# Patient Record
Sex: FEMALE | Race: WHITE | NOT HISPANIC OR LATINO | Employment: OTHER | URBAN - NONMETROPOLITAN AREA
[De-identification: names, ages, dates, MRNs, and addresses within clinical notes are randomized per-mention and may not be internally consistent; named-entity substitution may affect disease eponyms.]

---

## 2017-01-18 ENCOUNTER — HOSPITAL ENCOUNTER (OUTPATIENT)
Dept: GENERAL RADIOLOGY | Facility: HOSPITAL | Age: 72
Discharge: HOME | End: 2017-01-18
Attending: FAMILY MEDICINE | Admitting: FAMILY MEDICINE
Payer: COMMERCIAL

## 2017-01-18 PROCEDURE — 77080 DXA BONE DENSITY AXIAL: CPT | Mod: TC

## 2017-03-03 ENCOUNTER — OFFICE VISIT (OUTPATIENT)
Dept: CHIROPRACTIC MEDICINE | Facility: OTHER | Age: 72
End: 2017-03-03
Attending: CHIROPRACTOR
Payer: COMMERCIAL

## 2017-03-03 DIAGNOSIS — M54.50 ACUTE BILATERAL LOW BACK PAIN WITHOUT SCIATICA: ICD-10-CM

## 2017-03-03 DIAGNOSIS — M99.02 SEGMENTAL AND SOMATIC DYSFUNCTION OF THORACIC REGION: ICD-10-CM

## 2017-03-03 DIAGNOSIS — M99.03 SEGMENTAL AND SOMATIC DYSFUNCTION OF LUMBAR REGION: Primary | ICD-10-CM

## 2017-03-03 PROCEDURE — 98940 CHIROPRACT MANJ 1-2 REGIONS: CPT | Mod: AT | Performed by: CHIROPRACTOR

## 2017-03-03 NOTE — MR AVS SNAPSHOT
After Visit Summary   3/3/2017    Luz Maria Tan    MRN: [id number]           Patient Information     Date Of Birth          1945        Visit Information        Provider Department      3/3/2017 10:10 AM Osmin Sebastian DC  [facility]        Today's Diagnoses     Segmental and somatic dysfunction of lumbar region    -  1    Acute bilateral low back pain without sciatica        Segmental and somatic dysfunction of thoracic region           Follow-ups after your visit        Who to contact     If you have questions or need follow up information about today's clinic visit or your schedule please contact  [facility] ASHVIN ELIZALDE directly at [phone].  Normal or non-critical lab and imaging results will be communicated to you by iQVCloudhart, letter or phone within 4 business days after the clinic has received the results. If you do not hear from us within 7 days, please contact the clinic through iQVCloudhart or phone. If you have a critical or abnormal lab result, we will notify you by phone as soon as possible.  Submit refill requests through [employer] or call your pharmacy and they will forward the refill request to us. Please allow 3 business days for your refill to be completed.          Additional Information About Your Visit        MyChart Information     [employer] gives you secure access to your electronic health record. If you see a primary care provider, you can also send messages to your care team and make appointments. If you have questions, please call your primary care clinic.  If you do not have a primary care provider, please call [phone] and they will assist you.        Care EveryWhere ID     This is your Care EveryWhere ID. This could be used by other organizations to access your [facility] medical records  [id number]         Blood Pressure from Last 3 Encounters:   04/28/15 132/72   04/13/15 128/77   11/14/14 119/79    Weight from Last 3 Encounters:   04/27/15 181 lb (82.1  kg)              We Performed the Following     CHIROPRAC MANIP,SPINAL,1-2 REGIONS        Primary Care Provider Office Phone # Fax #    Familia Morton [phone]       [facility] [address] [address]        Thank you!     Thank you for choosing  [facility]  for your care. Our goal is always to provide you with excellent care. Hearing back from our patients is one way we can continue to improve our services. Please take a few minutes to complete the written survey that you may receive in the mail after your visit with us. Thank you!             Your Updated Medication List - Protect others around you: Learn how to safely use, store and throw away your medicines at www.disposemymeds.org.          This list is accurate as of: 3/3/17 11:59 PM.  Always use your most recent med list.                   Brand Name Dispense Instructions for use    ASPIRIN PO      Take 81 mg by mouth daily       CALCIUM 600 + MINERALS 600-200 MG-UNIT Tabs      Take 1 tablet by mouth daily       EVISTA 60 MG tablet   Generic drug:  raloxifene      Take 60 mg by mouth daily       Flaxseed Oil Oil      2 capsules daily       Garlic Oil 1000 MG Caps      Take 1 capsule by mouth daily       glucosamine-chondroitinoitin 750-600 MG Tabs      Take 1 tablet by mouth 2 times daily       LISINOPRIL PO      Take 20 mg by mouth daily       MULTIVITAMINS PO      Take 1 tablet by mouth daily       OMEGA-3 FISH OIL PO      Take 1 g by mouth daily

## 2017-03-07 NOTE — PROGRESS NOTES
Subjective Finding:    Chief compalint: Patient presents with:  Back Pain  , Pain Scale: 4/10, Intensity: dull, Duration: 1 weeks, Radiating: no.    Date of injury:     Activities that the pain restricts:   Home/household/hobbies/social activities: no.  Work duties: no.  Sleep: no.  Makes symptoms better: rest.  Makes symptoms worse: .  Have you seen anyone else for the symptoms? no.  Work related: no.  Automobile related injury: no.    Objective and Assessment:    Posture Analysis:   High shoulder: right.  Head tilt: right.  High iliac crest: .  Head carriage: forward.  Thoracic Kyphosis: neutral.  Lumbar Lordosis: neutral.    Lumbar Range of Motion: extension decreased.  Cervical Range of Motion: .  Thoracic Range of Motion: extension decreased.  Extremity Range of Motion: .    Palpation:    LUmbar paraspinal tenderness    Segmental dysfunction pre-treatment and treatment area: L3-4   T6.    Assessment post-treatment:  Cervical: ROM increased.  Thoracic: ROM increased.  Lumbar: .    Comments: .      Complicating Factors: .    Plan / Procedure:    Treatment plan: PRN.  Instructed patient: stretch as instructed at visit.  Short term goals: reduce pain.  Long term goals: restore normal function.  Prognosis: excellent.

## 2017-08-15 ENCOUNTER — DOCUMENTATION ONLY (OUTPATIENT)
Dept: OTHER | Facility: CLINIC | Age: 72
End: 2017-08-15

## 2017-08-15 PROBLEM — Z71.89 ACP (ADVANCE CARE PLANNING): Status: ACTIVE | Noted: 2017-08-15

## 2019-01-23 ENCOUNTER — TRANSFERRED RECORDS (OUTPATIENT)
Dept: HEALTH INFORMATION MANAGEMENT | Facility: HOSPITAL | Age: 74
End: 2019-01-23

## 2019-06-05 ENCOUNTER — ANESTHESIA EVENT (OUTPATIENT)
Dept: SURGERY | Facility: HOSPITAL | Age: 74
End: 2019-06-05
Payer: COMMERCIAL

## 2019-06-05 PROBLEM — H40.9 GLAUCOMA: Status: ACTIVE | Noted: 2019-06-05

## 2019-06-05 PROBLEM — I10 HYPERTENSION: Status: ACTIVE | Noted: 2019-06-05

## 2019-06-05 PROBLEM — E78.5 HYPERLIPIDEMIA: Status: ACTIVE | Noted: 2019-06-05

## 2019-06-05 ASSESSMENT — LIFESTYLE VARIABLES: TOBACCO_USE: 1

## 2019-06-05 NOTE — ANESTHESIA PREPROCEDURE EVALUATION
Anesthesia Evaluation     . Pt has had prior anesthetic.     No history of anesthetic complications          ROS/MED HX    ENT/Pulmonary: Comment: glaucoma    (+)tobacco use, Past use , . .    Neurologic:       Cardiovascular: Comment: Abdominal aortic atherosclerosis (H    (+) Dyslipidemia, hypertension-range: not on beta blocker, Peripheral Vascular Disease (Abdominal Aortic Atherosclerosis)---. : . . . :. .       METS/Exercise Tolerance:     Hematologic:         Musculoskeletal:         GI/Hepatic:     (+) bowel prep,       Renal/Genitourinary: Comment: Polycystic breast disease        Endo:     (+) Obesity, .      Psychiatric:         Infectious Disease:         Malignancy:         Other:    (+) No chance of pregnancy   - neg other ROS                 Physical Exam  Normal systems: cardiovascular, pulmonary and dental    Airway   Mallampati: III  TM distance: <3 FB  Neck ROM: full    Dental     Cardiovascular   Rhythm and rate: regular and normal      Pulmonary                     Anesthesia Plan      History & Physical Review  History and physical reviewed and following examination; no interval change.    ASA Status:  2 .        Plan for MAC Reason for MAC:  Deep or markedly invasive procedure (G8), Difficulty with conscious sedation (QS) and Extreme anxiety (QS)    Need HP      Postoperative Care      Consents  Anesthetic plan, risks, benefits and alternatives discussed with:  Patient..              No H&P              .    Anesthesia Plan      History & Physical Review  History and physical reviewed and following examination; no interval change.    ASA Status:  2 .        Plan for MAC Reason for MAC:  Deep or markedly invasive procedure (G8), Difficulty with conscious sedation (QS) and Extreme anxiety (QS)    Need HP      Postoperative Care      Consents  Anesthetic plan, risks, benefits and alternatives discussed with:  Patient..

## 2019-06-07 ENCOUNTER — ANESTHESIA (OUTPATIENT)
Dept: SURGERY | Facility: HOSPITAL | Age: 74
End: 2019-06-07
Payer: COMMERCIAL

## 2019-10-04 ENCOUNTER — HEALTH MAINTENANCE LETTER (OUTPATIENT)
Age: 74
End: 2019-10-04

## 2019-11-15 RX ORDER — MELOXICAM 15 MG/1
15 TABLET ORAL DAILY
COMMUNITY
End: 2019-11-15

## 2019-11-22 ENCOUNTER — HOSPITAL ENCOUNTER (OUTPATIENT)
Facility: HOSPITAL | Age: 74
Discharge: HOME | End: 2019-11-22
Attending: INTERNAL MEDICINE | Admitting: INTERNAL MEDICINE
Payer: COMMERCIAL

## 2019-11-22 VITALS
TEMPERATURE: 98.2 F | SYSTOLIC BLOOD PRESSURE: 135 MMHG | HEIGHT: 62 IN | OXYGEN SATURATION: 98 % | RESPIRATION RATE: 16 BRPM | HEART RATE: 75 BPM | BODY MASS INDEX: 32.76 KG/M2 | WEIGHT: 178 LBS | DIASTOLIC BLOOD PRESSURE: 66 MMHG

## 2019-11-22 PROCEDURE — 25800030 ZZH RX IP 258 OP 636: Performed by: NURSE ANESTHETIST, CERTIFIED REGISTERED

## 2019-11-22 PROCEDURE — 71000027 ZZH RECOVERY PHASE 2 EACH 15 MINS: Performed by: INTERNAL MEDICINE

## 2019-11-22 PROCEDURE — 25000125 ZZHC RX 250: Performed by: NURSE ANESTHETIST, CERTIFIED REGISTERED

## 2019-11-22 PROCEDURE — 40000305 ZZH STATISTIC PRE PROC ASSESS I: Performed by: INTERNAL MEDICINE

## 2019-11-22 PROCEDURE — 99100 ANES PT EXTEME AGE<1 YR&>70: CPT | Performed by: NURSE ANESTHETIST, CERTIFIED REGISTERED

## 2019-11-22 PROCEDURE — 36000050 ZZH SURGERY LEVEL 2 1ST 30 MIN: Performed by: INTERNAL MEDICINE

## 2019-11-22 PROCEDURE — 25000128 H RX IP 250 OP 636: Performed by: NURSE ANESTHETIST, CERTIFIED REGISTERED

## 2019-11-22 PROCEDURE — 37000008 ZZH ANESTHESIA TECHNICAL FEE, 1ST 30 MIN: Performed by: INTERNAL MEDICINE

## 2019-11-22 PROCEDURE — 45378 DIAGNOSTIC COLONOSCOPY: CPT | Performed by: NURSE ANESTHETIST, CERTIFIED REGISTERED

## 2019-11-22 RX ORDER — PROPOFOL 10 MG/ML
INJECTION, EMULSION INTRAVENOUS PRN
Status: DISCONTINUED | OUTPATIENT
Start: 2019-11-22 | End: 2019-11-22

## 2019-11-22 RX ORDER — LIDOCAINE 40 MG/G
CREAM TOPICAL
Status: DISCONTINUED | OUTPATIENT
Start: 2019-11-22 | End: 2019-11-22 | Stop reason: HOSPADM

## 2019-11-22 RX ORDER — ONDANSETRON 4 MG/1
4 TABLET, ORALLY DISINTEGRATING ORAL EVERY 30 MIN PRN
Status: DISCONTINUED | OUTPATIENT
Start: 2019-11-22 | End: 2019-11-22 | Stop reason: HOSPADM

## 2019-11-22 RX ORDER — SODIUM CHLORIDE, SODIUM LACTATE, POTASSIUM CHLORIDE, CALCIUM CHLORIDE 600; 310; 30; 20 MG/100ML; MG/100ML; MG/100ML; MG/100ML
INJECTION, SOLUTION INTRAVENOUS CONTINUOUS
Status: DISCONTINUED | OUTPATIENT
Start: 2019-11-22 | End: 2019-11-22 | Stop reason: HOSPADM

## 2019-11-22 RX ORDER — HYDROMORPHONE HYDROCHLORIDE 1 MG/ML
.3-.5 INJECTION, SOLUTION INTRAMUSCULAR; INTRAVENOUS; SUBCUTANEOUS EVERY 10 MIN PRN
Status: DISCONTINUED | OUTPATIENT
Start: 2019-11-22 | End: 2019-11-22 | Stop reason: HOSPADM

## 2019-11-22 RX ORDER — ALBUTEROL SULFATE 0.83 MG/ML
2.5 SOLUTION RESPIRATORY (INHALATION) EVERY 4 HOURS PRN
Status: DISCONTINUED | OUTPATIENT
Start: 2019-11-22 | End: 2019-11-22 | Stop reason: HOSPADM

## 2019-11-22 RX ORDER — LIDOCAINE HYDROCHLORIDE 20 MG/ML
INJECTION, SOLUTION INFILTRATION; PERINEURAL PRN
Status: DISCONTINUED | OUTPATIENT
Start: 2019-11-22 | End: 2019-11-22

## 2019-11-22 RX ORDER — NALOXONE HYDROCHLORIDE 0.4 MG/ML
.1-.4 INJECTION, SOLUTION INTRAMUSCULAR; INTRAVENOUS; SUBCUTANEOUS
Status: DISCONTINUED | OUTPATIENT
Start: 2019-11-22 | End: 2019-11-22 | Stop reason: HOSPADM

## 2019-11-22 RX ORDER — MEPERIDINE HYDROCHLORIDE 50 MG/ML
12.5 INJECTION INTRAMUSCULAR; INTRAVENOUS; SUBCUTANEOUS
Status: DISCONTINUED | OUTPATIENT
Start: 2019-11-22 | End: 2019-11-22 | Stop reason: HOSPADM

## 2019-11-22 RX ORDER — HYDRALAZINE HYDROCHLORIDE 20 MG/ML
2.5-5 INJECTION INTRAMUSCULAR; INTRAVENOUS EVERY 10 MIN PRN
Status: DISCONTINUED | OUTPATIENT
Start: 2019-11-22 | End: 2019-11-22 | Stop reason: HOSPADM

## 2019-11-22 RX ORDER — ONDANSETRON 2 MG/ML
4 INJECTION INTRAMUSCULAR; INTRAVENOUS EVERY 30 MIN PRN
Status: DISCONTINUED | OUTPATIENT
Start: 2019-11-22 | End: 2019-11-22 | Stop reason: HOSPADM

## 2019-11-22 RX ORDER — FENTANYL CITRATE 50 UG/ML
25-50 INJECTION, SOLUTION INTRAMUSCULAR; INTRAVENOUS
Status: DISCONTINUED | OUTPATIENT
Start: 2019-11-22 | End: 2019-11-22 | Stop reason: HOSPADM

## 2019-11-22 RX ADMIN — SODIUM CHLORIDE, POTASSIUM CHLORIDE, SODIUM LACTATE AND CALCIUM CHLORIDE: 600; 310; 30; 20 INJECTION, SOLUTION INTRAVENOUS at 08:54

## 2019-11-22 RX ADMIN — LIDOCAINE HYDROCHLORIDE 25 MG: 20 INJECTION, SOLUTION INFILTRATION; PERINEURAL at 09:42

## 2019-11-22 RX ADMIN — SODIUM CHLORIDE, POTASSIUM CHLORIDE, SODIUM LACTATE AND CALCIUM CHLORIDE: 600; 310; 30; 20 INJECTION, SOLUTION INTRAVENOUS at 09:38

## 2019-11-22 RX ADMIN — PROPOFOL 50 MG: 10 INJECTION, EMULSION INTRAVENOUS at 09:49

## 2019-11-22 RX ADMIN — PROPOFOL 50 MG: 10 INJECTION, EMULSION INTRAVENOUS at 09:41

## 2019-11-22 RX ADMIN — PROPOFOL 50 MG: 10 INJECTION, EMULSION INTRAVENOUS at 09:45

## 2019-11-22 RX ADMIN — PROPOFOL 10 MG: 10 INJECTION, EMULSION INTRAVENOUS at 09:54

## 2019-11-22 RX ADMIN — PROPOFOL 50 MG: 10 INJECTION, EMULSION INTRAVENOUS at 09:52

## 2019-11-22 ASSESSMENT — MIFFLIN-ST. JEOR: SCORE: 1255.65

## 2019-11-22 NOTE — BRIEF OP NOTE
[facility]    Brief Operative Note    Pre-operative diagnosis: SCREENING  Post-operative diagnosis Diverticulosis, Internal Hemorrhoids    Procedure: Procedure(s):  COLONOSCOPY  Surgeon: Surgeon(s) and Role:     * Abilio Muller MD - Primary  Anesthesia: Monitor Anesthesia Care   Estimated blood loss: None  Drains: None  Specimens: * No specimens in log *  Findings:   None.  Complications: None.  Implants: * No implants in log *

## 2019-11-22 NOTE — OP NOTE
Procedure Date: 2019      PRIMARY CARE PHYSICIAN:  Familia Morton MD      PROCEDURE:  Colonoscopy.      INDICATIONS: History of polyps.      HISTORY OF PRESENT ILLNESS:  Please refer to H and P for details.      PHYSICAL EXAMINATION:  Cardiopulmonary examination unchanged from previous exam.  Please refer to H and P for further details.      MEDICATIONS:  MAC per Anesthesia Service.  Monitored parameters were normal limits throughout.      DESCRIPTION OF PROCEDURE:  After informed consent was obtained, the patient was placed in the left lateral decubitus position.  An adult video colonoscope was advanced all the way to the cecum.  Cecal mucosa was normal.  The patient had a very tortuous colon that was easily reduced using the scope.  Copious irrigation and suctioning during the slow withdrawal improved our views.  The patient had left-sided diverticulosis.  There were some hyperplastic polyps in the sigmoid and the rectum that were not removed.  A retroflexion view in the rectum revealed small internal hemorrhoids, none of which were bleeding.  The air was then desufflated and the scope was withdrawn fully and completely without any complications.      DIAGNOSES:   1.  Severe left-sided diverticulosis.   2.  Internal hemorrhoids.      RECOMMENDATIONS:   1.  Discharge home.   2.  High-fiber diet.   3.  One tablespoon of Metamucil at night.   4.  No further colonoscopy recommended as this patient had a very tortuous colon unless there is a clinical reason.      Thank you for allowing us to participate in her management.         SOLOMON EMERY MD             D: 2019   T: 2019   MT: DANNY      Name:     YANA JARRETT   MRN:      [id number]        Account:        [id number]   :      1945           Procedure Date: 2019      Document: [id number]       cc: Familia Morton MD

## 2019-11-22 NOTE — ANESTHESIA POSTPROCEDURE EVALUATION
Patient: Luz Maria Tan    Procedure(s):  COLONOSCOPY    Diagnosis:SCREENING  Diagnosis Additional Information: No value filed.    Anesthesia Type:  MAC    Note:  Anesthesia Post Evaluation    Patient location during evaluation: Phase 2  Patient participation: Able to fully participate in evaluation  Level of consciousness: awake and alert  Pain management: adequate  Airway patency: patent  Cardiovascular status: blood pressure returned to baseline, acceptable and hemodynamically stable  Respiratory status: acceptable  Hydration status: acceptable  PONV: none     Anesthetic complications: None          Last vitals:  Vitals:    11/22/19 0824 11/22/19 1000   BP: 139/71 133/65   Pulse: 89 82   Resp: 18    Temp: 98.2  F (36.8  C)    SpO2: 96% 93%         Electronically Signed By: JAUN Castillo CRNA  November 22, 2019  10:08 AM

## 2019-11-22 NOTE — ANESTHESIA CARE TRANSFER NOTE
Patient: Luz Maria Tan    Procedure(s):  COLONOSCOPY    Diagnosis: SCREENING  Diagnosis Additional Information: No value filed.    Anesthesia Type:   MAC     Note:    Patient transferred to:Phase II  Handoff Report: Identifed the Patient, Identified the Reponsible Provider, Reviewed the pertinent medical history, Discussed the surgical course, Reviewed Intra-OP anesthesia mangement and issues during anesthesia, Set expectations for post-procedure period and Allowed opportunity for questions and acknowledgement of understanding      Vitals: (Last set prior to Anesthesia Care Transfer)    CRNA VITALS  11/22/2019 0928 - 11/22/2019 1001      11/22/2019             Resp Rate (set):  8                Electronically Signed By: JAUN Castillo CRNA  November 22, 2019  10:01 AM

## 2019-11-22 NOTE — OR NURSING
Pateint discharged to home.  Janice score 19. Pain level 0/10.  Discharged from unit via walking steady on feet. Home with friend.

## 2019-11-22 NOTE — DISCHARGE INSTRUCTIONS

## 2019-11-22 NOTE — H&P
Pre-Endoscopy History and Physical     Luz Maria Tan MRN# [id number]   YOB: 1945 Age: 74 year old     Primary care provider: Familia Morton  Type of Endoscopy: Colonoscopy with possible biopsy, possible polypectomy  Reason for Procedure: Polyps  Type of Anesthesia Anticipated: MAC    HPI:    Luz Maria is a 74 year old female who will be undergoing the above procedure.      A history and physical has been performed. The patient's medications and allergies have been reviewed. The risks and benefits of the procedure and the sedation options and risks were discussed with the patient.  All questions were answered and informed consent was obtained.      She denies a personal or family history of anesthesia complications or bleeding disorders.     Patient Active Problem List   Diagnosis     ACP (advance care planning)     Hypertension     Glaucoma     Hyperlipidemia        Past Medical History:   Diagnosis Date     Abdominal aortic atherosclerosis (H)      Glaucoma      Hyperlipidemia      Hypertension         Past Surgical History:   Procedure Laterality Date     COLONOSCOPY       COLONOSCOPY N/A 11/14/2014    Procedure: COLONOSCOPY;  Surgeon: Shady Hay MD;  Location: [facility]     COLONOSCOPY - HIM SCAN  01/01/1999    Colonoscopy 1999     COLONOSCOPY - HIM SCAN  01/01/2009    Colonoscopy 2009     GENITOURINARY SURGERY       PHACOEMULSIFICATION CLEAR CORNEA WITH TORIC INTRAOCULAR LENS IMPLANT Left 4/13/2015    Procedure: PHACOEMULSIFICATION CLEAR CORNEA WITH TORIC INTRAOCULAR LENS IMPLANT;  Surgeon: Horacio Rodriguez MD;  Location: [facility]     PHACOEMULSIFICATION WITH STANDARD INTRAOCULAR LENS IMPLANT Right 4/28/2015    Procedure: PHACOEMULSIFICATION WITH STANDARD INTRAOCULAR LENS IMPLANT;  Surgeon: Horacio Rodriguez MD;  Location: [facility]       Social History     Tobacco Use     Smoking status: Former Smoker   Substance Use Topics     Alcohol use: Not on file       History reviewed. No pertinent family  "history.    Prior to Admission medications    Medication Sig Start Date End Date Taking? Authorizing Provider   ASPIRIN PO Take 81 mg by mouth daily   Yes Reported, Patient   Calcium Carbonate-Vit D-Min (CALCIUM 600 + MINERALS) 600-200 MG-UNIT TABS Take 1 tablet by mouth daily   Yes Reported, Patient   Flaxseed Oil OIL 2,000 mg daily    Yes Reported, Patient   Garlic Oil 1000 MG CAPS Take 1 capsule by mouth daily   Yes Reported, Patient   glucosamine-chondroitinoitin 750-600 MG TABS Take 1 tablet by mouth daily    Yes Reported, Patient   LISINOPRIL PO Take 20 mg by mouth daily   Yes Reported, Patient   Multiple Vitamin (MULTIVITAMINS PO) Take 1 tablet by mouth daily   Yes Reported, Patient   Omega-3 Fatty Acids (OMEGA-3 FISH OIL PO) Take 1 g by mouth daily   Yes Reported, Patient   raloxifene (EVISTA) 60 MG tablet Take 60 mg by mouth daily   Yes Reported, Patient       Allergies   Allergen Reactions     Bactrim Ds [Sulfamethoxazole W/Trimethoprim]         REVIEW OF SYSTEMS:   5 point ROS negative except as noted above in HPI, including Gen., Resp., CV, GI &  system review.    PHYSICAL EXAM:   /71   Pulse 89   Temp 98.2  F (36.8  C)   Resp 18   Ht 1.575 m (5' 2\")   Wt 80.7 kg (178 lb)   SpO2 96%   BMI 32.56 kg/m   Estimated body mass index is 32.56 kg/m  as calculated from the following:    Height as of this encounter: 1.575 m (5' 2\").    Weight as of this encounter: 80.7 kg (178 lb).   GENERAL APPEARANCE: alert, and oriented  MENTAL STATUS: alert  AIRWAY EXAM: Mallampatti Class II (visualization of the soft palate, fauces, and uvula)  RESP: lungs clear to auscultation - no rales, rhonchi or wheezes  CV: regular rates and rhythm  DIAGNOSTICS:    Not indicated    IMPRESSION   ASA Class 2 - Mild systemic disease    PLAN:   Plan for Colonoscopy with possible biopsy, possible polypectomy. We discussed the risks, benefits and alternatives and the patient wished to proceed.    The above has been forwarded to " the consulting provider.      Signed Electronically by: Abilio Muller MD  November 22, 2019

## 2020-02-08 ENCOUNTER — HEALTH MAINTENANCE LETTER (OUTPATIENT)
Age: 75
End: 2020-02-08

## 2020-11-08 ENCOUNTER — HEALTH MAINTENANCE LETTER (OUTPATIENT)
Age: 75
End: 2020-11-08

## 2020-12-29 ENCOUNTER — MEDICAL CORRESPONDENCE (OUTPATIENT)
Dept: HEALTH INFORMATION MANAGEMENT | Facility: HOSPITAL | Age: 75
End: 2020-12-29

## 2021-02-04 ENCOUNTER — IMMUNIZATION (OUTPATIENT)
Dept: FAMILY MEDICINE | Facility: OTHER | Age: 76
End: 2021-02-04
Attending: FAMILY MEDICINE
Payer: COMMERCIAL

## 2021-02-04 PROCEDURE — 91300 PR COVID VAC PFIZER DIL RECON 30 MCG/0.3 ML IM: CPT

## 2021-02-25 ENCOUNTER — IMMUNIZATION (OUTPATIENT)
Dept: FAMILY MEDICINE | Facility: OTHER | Age: 76
End: 2021-02-25
Attending: FAMILY MEDICINE
Payer: COMMERCIAL

## 2021-02-25 PROCEDURE — 91300 PR COVID VAC PFIZER DIL RECON 30 MCG/0.3 ML IM: CPT

## 2021-02-26 ENCOUNTER — HOSPITAL ENCOUNTER (OUTPATIENT)
Dept: BONE DENSITY | Facility: HOSPITAL | Age: 76
Discharge: HOME | End: 2021-02-26
Attending: FAMILY MEDICINE | Admitting: FAMILY MEDICINE
Payer: COMMERCIAL

## 2021-02-26 DIAGNOSIS — M81.0 OSTEOPOROSIS WITHOUT CURRENT PATHOLOGICAL FRACTURE: ICD-10-CM

## 2021-02-26 PROCEDURE — 77080 DXA BONE DENSITY AXIAL: CPT

## 2021-03-28 ENCOUNTER — HEALTH MAINTENANCE LETTER (OUTPATIENT)
Age: 76
End: 2021-03-28

## 2021-09-11 ENCOUNTER — HEALTH MAINTENANCE LETTER (OUTPATIENT)
Age: 76
End: 2021-09-11

## 2022-04-23 ENCOUNTER — HEALTH MAINTENANCE LETTER (OUTPATIENT)
Age: 77
End: 2022-04-23

## 2022-08-07 ENCOUNTER — HOSPITAL ENCOUNTER (EMERGENCY)
Facility: HOSPITAL | Age: 77
Discharge: HOME | End: 2022-08-07
Attending: NURSE PRACTITIONER | Admitting: NURSE PRACTITIONER
Payer: COMMERCIAL

## 2022-08-07 VITALS
TEMPERATURE: 99.5 F | RESPIRATION RATE: 16 BRPM | DIASTOLIC BLOOD PRESSURE: 80 MMHG | SYSTOLIC BLOOD PRESSURE: 149 MMHG | OXYGEN SATURATION: 98 % | HEART RATE: 56 BPM

## 2022-08-07 DIAGNOSIS — S01.552A OPEN WOUND OF TONGUE DUE TO BITE: ICD-10-CM

## 2022-08-07 PROCEDURE — G0463 HOSPITAL OUTPT CLINIC VISIT: HCPCS

## 2022-08-07 PROCEDURE — 99213 OFFICE O/P EST LOW 20 MIN: CPT | Performed by: NURSE PRACTITIONER

## 2022-08-07 RX ORDER — ROSUVASTATIN CALCIUM 10 MG/1
TABLET, COATED ORAL
COMMUNITY
Start: 2022-07-22

## 2022-08-07 ASSESSMENT — ENCOUNTER SYMPTOMS
PSYCHIATRIC NEGATIVE: 1
CONSTITUTIONAL NEGATIVE: 1
HEMATOLOGIC/LYMPHATIC NEGATIVE: 1
GASTROINTESTINAL NEGATIVE: 1
NEUROLOGICAL NEGATIVE: 1
MUSCULOSKELETAL NEGATIVE: 1
CARDIOVASCULAR NEGATIVE: 1
ENDOCRINE NEGATIVE: 1
ALLERGIC/IMMUNOLOGIC NEGATIVE: 1
EYES NEGATIVE: 1
RESPIRATORY NEGATIVE: 1

## 2022-08-07 NOTE — ED TRIAGE NOTES
Pt presents with c/o mouth wound because she states she bit her tongue last night. Concerned about the pain and that wound is in her mouth. Pt states it did not hurt to brush her teeth. Pt states she does use a little Listerine when she brushes her teeth. Pt is drinking plenty of fluids. Not affecting eating or drinking habits. States that warm fluids are the only thing that irritate it along with talking, makes it feel sore. No otc meds.

## 2022-08-07 NOTE — ED TRIAGE NOTES
"\"Tongue is hurting when I talk or eat something, I bit it in my sleep last night.  No bleeding present.\"        "

## 2022-08-07 NOTE — ED PROVIDER NOTES
History     Chief Complaint   Patient presents with     Tongue Lesion(S)     The history is provided by the patient.     Luz Maria Tan is a 76 year old female who presents to the  after biting tongue last night when she was sleeping.  She does have some discomfort.  She noted watery blood on her pillow last night.  She does have some discomfort with talking and eating.  She is able to drink, but hot fluids cause some discomfort.  She does take a daily Asprin and denies any other blood thinners. No active bleeding at this time.       Allergies:  Allergies   Allergen Reactions     Bactrim Ds [Sulfamethoxazole W/Trimethoprim]        Problem List:    Patient Active Problem List    Diagnosis Date Noted     Hypertension 06/05/2019     Priority: Medium     Glaucoma 06/05/2019     Priority: Medium     Hyperlipidemia 06/05/2019     Priority: Medium     ACP (advance care planning) 08/15/2017     Priority: Medium        Past Medical History:    Past Medical History:   Diagnosis Date     Abdominal aortic atherosclerosis (H)      Glaucoma      Hyperlipidemia      Hypertension        Past Surgical History:    Past Surgical History:   Procedure Laterality Date     COLONOSCOPY       COLONOSCOPY N/A 11/14/2014    Procedure: COLONOSCOPY;  Surgeon: Shady Hay MD;  Location: [facility]     COLONOSCOPY N/A 11/22/2019    Procedure: COLONOSCOPY;  Surgeon: Abilio Muller MD;  Location: [facility]     COLONOSCOPY - HIM SCAN  01/01/1999    Colonoscopy 1999     COLONOSCOPY - HIM SCAN  01/01/2009    Colonoscopy 2009     GENITOURINARY SURGERY       PHACOEMULSIFICATION CLEAR CORNEA WITH TORIC INTRAOCULAR LENS IMPLANT Left 4/13/2015    Procedure: PHACOEMULSIFICATION CLEAR CORNEA WITH TORIC INTRAOCULAR LENS IMPLANT;  Surgeon: Horacio Rodriguez MD;  Location: [facility]     PHACOEMULSIFICATION WITH STANDARD INTRAOCULAR LENS IMPLANT Right 4/28/2015    Procedure: PHACOEMULSIFICATION WITH STANDARD INTRAOCULAR LENS IMPLANT;  Surgeon: Horacio Rodriguez MD;   Location: [facility]       Family History:    No family history on file.    Social History:  Marital Status:  Single [1]  Social History     Tobacco Use     Smoking status: Former Smoker        Medications:    ASPIRIN PO  Calcium Carbonate-Vit D-Min (CALCIUM 600 + MINERALS) 600-200 MG-UNIT TABS  Flaxseed Oil OIL  Garlic Oil 1000 MG CAPS  glucosamine-chondroitinoitin 750-600 MG TABS  LISINOPRIL PO  metFORMIN (GLUCOPHAGE) 500 MG tablet  Multiple Vitamin (MULTIVITAMINS PO)  Omega-3 Fatty Acids (OMEGA-3 FISH OIL PO)  raloxifene (EVISTA) 60 MG tablet  rosuvastatin (CRESTOR) 10 MG tablet          Review of Systems   Constitutional: Negative.    HENT:        Sore to tongue    Eyes: Negative.    Respiratory: Negative.    Cardiovascular: Negative.    Gastrointestinal: Negative.    Endocrine: Negative.    Genitourinary: Negative.    Musculoskeletal: Negative.    Skin: Negative.    Allergic/Immunologic: Negative.    Neurological: Negative.    Hematological: Negative.    Psychiatric/Behavioral: Negative.        Physical Exam   BP: 149/80  Pulse: 56  Temp: 99.5  F (37.5  C)  Resp: 16  SpO2: 98 %      Physical Exam  Vitals and nursing note reviewed.   Constitutional:       Appearance: Normal appearance.   HENT:      Mouth/Throat:     Cardiovascular:      Rate and Rhythm: Normal rate.   Pulmonary:      Effort: Pulmonary effort is normal. No respiratory distress.   Neurological:      Mental Status: She is alert and oriented to person, place, and time.   Psychiatric:         Mood and Affect: Mood normal.         Behavior: Behavior normal.         ED Course                 Procedures              Critical Care time:  none               No results found for this or any previous visit (from the past 24 hour(s)).    Medications - No data to display    Assessments & Plan (with Medical Decision Making)     I have reviewed the nursing notes.    I have reviewed the findings, diagnosis, plan and need for follow up with the patient.      Patient  verbally educated and given appropriate education sheets for the diagnoses and has no questions.  Take medications as directed.   Follow up with your Primary Care provider if symptoms increase or if further concerns develop, return to the ER    Salt water rinsing to mouth 2-3 times a day   Rinse with plan water after anything you eat or drink that is not water    Return here if any swelling into tongue, difficulty swallowing or any concerns  New Prescriptions    No medications on file       Final diagnoses:   Open wound of tongue due to bite       8/7/2022   [facility]     Santa Cheng APRN CNP  08/07/22 1288

## 2022-08-07 NOTE — DISCHARGE INSTRUCTIONS
Salt water rinsing to mouth 2-3 times a day   Rinse with plan water after anything you eat or drink that is not water    Return here if any swelling into tongue, difficulty swallowing or any concerns

## 2022-10-12 ENCOUNTER — IMMUNIZATION (OUTPATIENT)
Dept: FAMILY MEDICINE | Facility: OTHER | Age: 77
End: 2022-10-12
Attending: FAMILY MEDICINE
Payer: COMMERCIAL

## 2022-10-12 DIAGNOSIS — Z23 NEED FOR PROPHYLACTIC VACCINATION AND INOCULATION AGAINST INFLUENZA: Primary | ICD-10-CM

## 2022-10-12 PROCEDURE — G0008 ADMIN INFLUENZA VIRUS VAC: HCPCS

## 2023-02-27 LAB
ALBUMIN (URINE) MG/SPEC: 8 MG/L
ALBUMIN/CREATININE RATIO: 6 MG/GM (ref 0–30)
ALT SERPL-CCNC: 20 U/L (ref 18–65)
AST SERPL-CCNC: 24 U/L (ref 10–30)
CHOLESTEROL (EXTERNAL): 133 MG/DL
CREATININE (EXTERNAL): 0.83 MG/DL (ref 0.7–1.2)
CREATININE (URINE): 143.5 MG/DL
GFR ESTIMATED (EXTERNAL): 72 ML/MIN/1.73M2
GLUCOSE (EXTERNAL): 96 MG/DL (ref 60–99)
HBA1C MFR BLD: 5.7 %
HDLC SERPL-MCNC: 70 MG/DL
LDL CHOLESTEROL CALCULATED (EXTERNAL): 48 MG/DL
POTASSIUM (EXTERNAL): 4.2 MMOL/L (ref 3.5–5.1)
TRIGLYCERIDES (EXTERNAL): 73 MG/DL

## 2023-03-01 ENCOUNTER — MEDICAL CORRESPONDENCE (OUTPATIENT)
Dept: ADMINISTRATIVE | Facility: CLINIC | Age: 78
End: 2023-03-01

## 2023-03-06 ENCOUNTER — HOSPITAL ENCOUNTER (OUTPATIENT)
Dept: BONE DENSITY | Facility: HOSPITAL | Age: 78
Discharge: HOME | End: 2023-03-06
Attending: NURSE PRACTITIONER | Admitting: NURSE PRACTITIONER
Payer: COMMERCIAL

## 2023-03-06 DIAGNOSIS — Z78.0 ASYMPTOMATIC MENOPAUSAL STATE: ICD-10-CM

## 2023-03-06 PROCEDURE — 77080 DXA BONE DENSITY AXIAL: CPT

## 2023-06-01 ENCOUNTER — HEALTH MAINTENANCE LETTER (OUTPATIENT)
Age: 78
End: 2023-06-01

## 2023-08-21 LAB
CHOLESTEROL (EXTERNAL): 146 MG/DL
CREATININE (EXTERNAL): 0.87 MG/DL (ref 0.7–1.2)
GFR ESTIMATED (EXTERNAL): 68 ML/MIN/1.73M2
GLUCOSE (EXTERNAL): 104 MG/DL (ref 60–99)
HBA1C MFR BLD: 5.8 %
HDLC SERPL-MCNC: 65 MG/DL
LDL CHOLESTEROL CALCULATED (EXTERNAL): 67 MG/DL
POTASSIUM (EXTERNAL): 4.5 MMOL/L (ref 3.5–5.1)
TRIGLYCERIDES (EXTERNAL): 71 MG/DL

## 2024-02-26 LAB
ALBUMIN (URINE) MG/SPEC: 5 MG/L
ALBUMIN/CREATININE RATIO: 3 MG/GM (ref 0–30)
ALT SERPL-CCNC: 20 U/L (ref 18–65)
AST SERPL-CCNC: 26 U/L (ref 10–30)
CHOLESTEROL (EXTERNAL): 123 MG/DL
CREATININE (EXTERNAL): 0.89 MG/DL (ref 0.7–1.2)
CREATININE (URINE): 178.5 MG/DL
GFR ESTIMATED (EXTERNAL): 66 ML/MIN/1.73M2
GLUCOSE (EXTERNAL): 119 MG/DL (ref 60–99)
HBA1C MFR BLD: 6.1 %
HDLC SERPL-MCNC: 58 MG/DL
LDL CHOLESTEROL CALCULATED (EXTERNAL): 49 MG/DL
POTASSIUM (EXTERNAL): 4.2 MMOL/L (ref 3.5–5.1)
TRIGLYCERIDES (EXTERNAL): 81 MG/DL

## 2024-03-11 LAB — COLOGUARD-ABSTRACT: NEGATIVE

## 2024-03-20 ENCOUNTER — HOSPITAL ENCOUNTER (OUTPATIENT)
Dept: BONE DENSITY | Facility: HOSPITAL | Age: 79
Discharge: HOME | End: 2024-03-20
Attending: NURSE PRACTITIONER | Admitting: NURSE PRACTITIONER
Payer: COMMERCIAL

## 2024-03-20 DIAGNOSIS — M85.80 OTHER SPECIFIED DISORDERS OF BONE DENSITY AND STRUCTURE, UNSPECIFIED SITE: ICD-10-CM

## 2024-03-20 DIAGNOSIS — Z78.0 ASYMPTOMATIC MENOPAUSAL STATE: ICD-10-CM

## 2024-03-20 PROCEDURE — 77080 DXA BONE DENSITY AXIAL: CPT

## 2024-06-04 ENCOUNTER — MEDICAL CORRESPONDENCE (OUTPATIENT)
Dept: MRI IMAGING | Facility: HOSPITAL | Age: 79
End: 2024-06-04

## 2024-06-09 ENCOUNTER — HEALTH MAINTENANCE LETTER (OUTPATIENT)
Age: 79
End: 2024-06-09

## 2024-06-18 ENCOUNTER — HOSPITAL ENCOUNTER (OUTPATIENT)
Dept: MRI IMAGING | Facility: HOSPITAL | Age: 79
Discharge: HOME | End: 2024-06-18
Attending: OTOLARYNGOLOGY
Payer: COMMERCIAL

## 2024-06-18 DIAGNOSIS — H90.3 SENSORINEURAL HEARING LOSS, BILATERAL: ICD-10-CM

## 2024-06-18 PROCEDURE — 70553 MRI BRAIN STEM W/O & W/DYE: CPT

## 2024-06-18 PROCEDURE — 70543 MRI ORBT/FAC/NCK W/O &W/DYE: CPT

## 2024-06-18 PROCEDURE — A9585 GADOBUTROL INJECTION: HCPCS | Mod: JZ | Performed by: RADIOLOGY

## 2024-06-18 PROCEDURE — 255N000002 HC RX 255 OP 636: Mod: JZ | Performed by: RADIOLOGY

## 2024-06-18 RX ORDER — GADOBUTROL 604.72 MG/ML
7.5 INJECTION INTRAVENOUS ONCE
Status: COMPLETED | OUTPATIENT
Start: 2024-06-18 | End: 2024-06-18

## 2024-06-18 RX ADMIN — GADOBUTROL 7.5 ML: 604.72 INJECTION INTRAVENOUS at 14:17

## 2024-09-20 LAB
CHOLESTEROL (EXTERNAL): 122 MG/DL
CREATININE (EXTERNAL): 0.92 MG/DL (ref 0.7–1.2)
GFR ESTIMATED (EXTERNAL): 63 ML/MIN/1.73M2
GLUCOSE (EXTERNAL): 118 MG/DL (ref 70–100)
HBA1C MFR BLD: 6.5 %
HDLC SERPL-MCNC: 49 MG/DL
LDL CHOLESTEROL CALCULATED (EXTERNAL): 52 MG/DL
POTASSIUM (EXTERNAL): 4.4 MMOL/L (ref 3.5–5.1)
TRIGLYCERIDES (EXTERNAL): 104 MG/DL

## 2025-03-03 ENCOUNTER — TRANSFERRED RECORDS (OUTPATIENT)
Dept: HEALTH INFORMATION MANAGEMENT | Facility: CLINIC | Age: 80
End: 2025-03-03

## 2025-03-03 LAB
ALBUMIN (URINE) MG/SPEC: 8 MG/L
ALBUMIN/CREATININE RATIO: 4 MG/GM (ref 0–30)
ALT SERPL-CCNC: 17 U/L (ref 18–65)
AST SERPL-CCNC: 22 U/L (ref 10–30)
CHOLESTEROL (EXTERNAL): 131 MG/DL
CREATININE (EXTERNAL): 0.87 MG/DL (ref 0.7–1.2)
CREATININE (URINE): 178 MG/DL
GFR ESTIMATED (EXTERNAL): 67 ML/MIN/1.73M2
GLUCOSE (EXTERNAL): 118 MG/DL (ref 70–100)
HBA1C MFR BLD: 6.3 %
HDLC SERPL-MCNC: 53 MG/DL
LDL CHOLESTEROL CALCULATED (EXTERNAL): 58 MG/DL
POTASSIUM (EXTERNAL): 4 MMOL/L (ref 3.5–5.1)
TRIGLYCERIDES (EXTERNAL): 102 MG/DL
TSH SERPL-ACNC: 3.13 UIU/ML (ref 0.35–4.8)

## 2025-03-05 ENCOUNTER — TRANSFERRED RECORDS (OUTPATIENT)
Dept: HEALTH INFORMATION MANAGEMENT | Facility: CLINIC | Age: 80
End: 2025-03-05

## 2025-03-11 ENCOUNTER — TRANSFERRED RECORDS (OUTPATIENT)
Dept: HEALTH INFORMATION MANAGEMENT | Facility: CLINIC | Age: 80
End: 2025-03-11

## 2025-06-25 ENCOUNTER — TELEPHONE (OUTPATIENT)
Dept: FAMILY MEDICINE | Facility: OTHER | Age: 80
End: 2025-06-25

## 2025-06-25 DIAGNOSIS — R73.01 IMPAIRED FASTING GLUCOSE: ICD-10-CM

## 2025-06-25 DIAGNOSIS — E78.2 MIXED HYPERLIPIDEMIA: Primary | ICD-10-CM

## 2025-07-02 RX ORDER — BLOOD SUGAR DIAGNOSTIC
STRIP MISCELLANEOUS
COMMUNITY
Start: 2024-09-23 | End: 2025-07-14

## 2025-07-02 RX ORDER — LANCETS 33 GAUGE
EACH MISCELLANEOUS
COMMUNITY
Start: 2024-09-23

## 2025-07-14 ENCOUNTER — OFFICE VISIT (OUTPATIENT)
Dept: FAMILY MEDICINE | Facility: OTHER | Age: 80
End: 2025-07-14
Attending: NURSE PRACTITIONER
Payer: COMMERCIAL

## 2025-07-14 ENCOUNTER — LAB (OUTPATIENT)
Dept: LAB | Facility: OTHER | Age: 80
End: 2025-07-14
Attending: NURSE PRACTITIONER
Payer: COMMERCIAL

## 2025-07-14 VITALS
BODY MASS INDEX: 28.76 KG/M2 | TEMPERATURE: 99.5 F | OXYGEN SATURATION: 98 % | DIASTOLIC BLOOD PRESSURE: 70 MMHG | HEART RATE: 72 BPM | SYSTOLIC BLOOD PRESSURE: 104 MMHG | WEIGHT: 156.3 LBS | HEIGHT: 62 IN

## 2025-07-14 DIAGNOSIS — E11.9 TYPE 2 DIABETES MELLITUS WITHOUT COMPLICATION, WITHOUT LONG-TERM CURRENT USE OF INSULIN (H): Primary | ICD-10-CM

## 2025-07-14 DIAGNOSIS — E78.2 MIXED HYPERLIPIDEMIA: ICD-10-CM

## 2025-07-14 DIAGNOSIS — R73.01 IMPAIRED FASTING GLUCOSE: ICD-10-CM

## 2025-07-14 DIAGNOSIS — I10 PRIMARY HYPERTENSION: ICD-10-CM

## 2025-07-14 LAB
ANION GAP SERPL CALCULATED.3IONS-SCNC: 11 MMOL/L (ref 7–15)
BUN SERPL-MCNC: 20.6 MG/DL (ref 8–23)
CALCIUM SERPL-MCNC: 9.1 MG/DL (ref 8.8–10.4)
CHLORIDE SERPL-SCNC: 100 MMOL/L (ref 98–107)
CHOLEST SERPL-MCNC: 120 MG/DL
CREAT SERPL-MCNC: 0.94 MG/DL (ref 0.51–0.95)
EGFRCR SERPLBLD CKD-EPI 2021: 61 ML/MIN/1.73M2
EST. AVERAGE GLUCOSE BLD GHB EST-MCNC: 146 MG/DL
FASTING STATUS PATIENT QL REPORTED: YES
FASTING STATUS PATIENT QL REPORTED: YES
GLUCOSE SERPL-MCNC: 128 MG/DL (ref 70–99)
HBA1C MFR BLD: 6.7 %
HCO3 SERPL-SCNC: 26 MMOL/L (ref 22–29)
HDLC SERPL-MCNC: 54 MG/DL
LDLC SERPL CALC-MCNC: 45 MG/DL
NONHDLC SERPL-MCNC: 66 MG/DL
POTASSIUM SERPL-SCNC: 4 MMOL/L (ref 3.4–5.3)
SODIUM SERPL-SCNC: 137 MMOL/L (ref 135–145)
TRIGL SERPL-MCNC: 103 MG/DL

## 2025-07-14 PROCEDURE — 82465 ASSAY BLD/SERUM CHOLESTEROL: CPT | Mod: ZL

## 2025-07-14 PROCEDURE — 99213 OFFICE O/P EST LOW 20 MIN: CPT | Performed by: NURSE PRACTITIONER

## 2025-07-14 PROCEDURE — 80048 BASIC METABOLIC PNL TOTAL CA: CPT | Mod: ZL

## 2025-07-14 PROCEDURE — 3044F HG A1C LEVEL LT 7.0%: CPT

## 2025-07-14 PROCEDURE — 3048F LDL-C <100 MG/DL: CPT

## 2025-07-14 PROCEDURE — G2211 COMPLEX E/M VISIT ADD ON: HCPCS | Performed by: NURSE PRACTITIONER

## 2025-07-14 PROCEDURE — 1126F AMNT PAIN NOTED NONE PRSNT: CPT | Performed by: NURSE PRACTITIONER

## 2025-07-14 PROCEDURE — 3074F SYST BP LT 130 MM HG: CPT | Performed by: NURSE PRACTITIONER

## 2025-07-14 PROCEDURE — 36415 COLL VENOUS BLD VENIPUNCTURE: CPT | Mod: ZL

## 2025-07-14 PROCEDURE — 83036 HEMOGLOBIN GLYCOSYLATED A1C: CPT | Mod: ZL

## 2025-07-14 PROCEDURE — 3078F DIAST BP <80 MM HG: CPT | Performed by: NURSE PRACTITIONER

## 2025-07-14 PROCEDURE — G0463 HOSPITAL OUTPT CLINIC VISIT: HCPCS

## 2025-07-14 ASSESSMENT — PAIN SCALES - GENERAL: PAINLEVEL_OUTOF10: NO PAIN (0)

## 2025-07-14 NOTE — PROGRESS NOTES
"  Assessment & Plan   (E11.9) Type 2 diabetes mellitus without complication, without long-term current use of insulin (H)  (primary encounter diagnosis)  Comment: Suboptimal control, slightly worsened  Plan: Fasting lab results obtained today and reviewed with patient.  A1c is slightly elevated at 6.7.  A1c was previously slightly elevated at 6.3 on 3/3/2025.  Fasting glucose is slightly elevated at 128.  Remaining Chem 8 is normal.  Lipid profile is normal.  Patient is currently taking metformin 250 mg daily.  She will increase the dose of metformin to 500 mg daily.  She will return to the clinic office in 6 months for follow-up of her chronic medical conditions and fasting lab work.  She will also return to the clinic office as needed.    (E78.2) Mixed hyperlipidemia  Comment: Controlled  Plan: Lipid profile is normal.  Will continue with Crestor 10 mg daily.  Will continue to monitor.    (I10) Primary hypertension  Comment: Controlled  Plan: Blood pressure is normal today.  Fasting glucose is slightly elevated at 128.  Remaining Chem 8 is normal.  Will continue with lisinopril 20 mg daily.  Will continue to monitor.    BMI  Estimated body mass index is 28.59 kg/m  as calculated from the following:    Height as of this encounter: 1.575 m (5' 2\").    Weight as of this encounter: 70.9 kg (156 lb 4.8 oz).   Weight management plan: Discussed healthy diet and exercise guidelines.  Weight is acceptable.    Follow-up   Return in about 6 months (around 1/14/2026), or diabetes with fasting labs first.    The longitudinal plan of care for the diagnosis(es)/condition(s) as documented were addressed during this visit. Due to the added complexity in care, I will continue to support Luz Maria in the subsequent management and with ongoing continuity of care.      Mat Loera is a 79 year old, presenting for the following health issues:  Diabetes        7/14/2025     1:36 PM   Additional Questions   Roomed by Paty VASQUES "   Accompanied by self       HPI  Luz Maria is an 79-year-old female here for follow-up of type 2 diabetes mellitus, mixed hyperlipidemia, and hypertension.  She is currently taking metformin 250 mg daily.  No recent signs or symptoms of hypoglycemia or hyperglycemia.  Her blood pressure has been manageable with use of lisinopril.  No shortness of breath or chest pain.  No recent lower extremity edema.  No problems with her feet.  She is currently taking Crestor for management of mixed hyperlipidemia without any adverse side effects.  She overall feels that she is currently doing well.      History of Present Illness       Diabetes:   She presents for follow up of diabetes.    She is not checking blood glucose.         She has no concerns regarding her diabetes at this time.   She is not experiencing numbness or burning in feet, excessive thirst, blurry vision, weight changes or redness, sores or blisters on feet.           She eats 4 or more servings of fruits and vegetables daily.She consumes 0 sweetened beverage(s) daily.She exercises with enough effort to increase her heart rate 20 to 29 minutes per day.  She exercises with enough effort to increase her heart rate 7 days per week.   She is taking medications regularly.        Diabetes Follow-up    How often are you checking your blood sugar? Not at all  What concerns do you have today about your diabetes? None   Do you have any of these symptoms? (Select all that apply)  No numbness or tingling in feet.  No redness, sores or blisters on feet.  No complaints of excessive thirst.  No reports of blurry vision.  No significant changes to weight.  Have you had a diabetic eye exam in the last 12 months? Yes- Date of last eye exam: unknown ,  Location: [facility]     Diabetic Foot Screen:  Any complaints of increased pain or numbness ? No  Is there a foot ulcer now or a history of foot ulcer? No  Does the foot have an abnormal shape? No  Are the nails thick, too  long or ingrown? No  Are there any redness or open areas? No         Sensation Testing done at all points on the diagram with monofilament     Right Foot: Sensation Normal at all points  Left Foot: Sensation Normal at all points     Risk Category: 0- No loss of protective sensation  Performed by Soraida Zaidi           Hyperlipidemia Follow-Up    Are you regularly taking any medication or supplement to lower your cholesterol?   Yes- Crestor 10 mg   Are you having muscle aches or other side effects that you think could be caused by your cholesterol lowering medication?  No    Hypertension Follow-up    Do you check your blood pressure regularly outside of the clinic? No   Are you following a low salt diet? Yes  Are your blood pressures ever more than 140 on the top number (systolic) OR more   than 90 on the bottom number (diastolic), for example 140/90? N/A    BP Readings from Last 2 Encounters:   07/14/25 104/70   08/07/22 149/80     Hemoglobin A1C (%)   Date Value   07/14/2025 6.7 (H)     LDL Cholesterol Calculated (mg/dL)   Date Value   07/14/2025 45       Allergies   Allergen Reactions    Bactrim Ds [Sulfamethoxazole-Trimethoprim]          Current Outpatient Medications   Medication Sig Dispense Refill    ASPIRIN PO Take 81 mg by mouth daily      blood glucose monitoring (ONE TOUCH ULTRA 2) meter device kit by [employer] route daily.      Calcium Carbonate-Vit D-Min (CALCIUM 600 + MINERALS) 600-200 MG-UNIT TABS Take 1 tablet by mouth daily      Flaxseed Oil OIL 2,000 mg daily       Garlic Oil 1000 MG CAPS Take 1 capsule by mouth daily      glucosamine-chondroitinoitin 750-600 MG TABS Take 1 tablet by mouth daily       Lancets (ONETOUCH DELICA PLUS [id number]) MISC TEST ONCE DAILY AND AS NEEDED, DX-E11.9      LISINOPRIL PO Take 20 mg by mouth daily      metFORMIN (GLUCOPHAGE) 500 MG tablet Take 1 tablet (500 mg) by mouth daily (with breakfast).      Multiple Vitamin (MULTIVITAMINS PO) Take 1 tablet by mouth  daily      Omega-3 Fatty Acids (OMEGA-3 FISH OIL PO) Take 1 g by mouth daily      raloxifene (EVISTA) 60 MG tablet Take 60 mg by mouth daily      rosuvastatin (CRESTOR) 10 MG tablet TAKE 1 TABLET DAILY BY MOUTH       No current facility-administered medications for this visit.          Patient Active Problem List   Diagnosis    ACP (advance care planning)    Primary hypertension    Glaucoma    Mixed hyperlipidemia    Type 2 diabetes mellitus without complication (H)    Osteopenia    Menopause    Diverticulosis of large intestine without perforation or abscess without bleeding    Abdominal aortic atherosclerosis          Past Medical History:   Diagnosis Date    Abdominal aortic atherosclerosis     Actinic keratosis 08/22/2019    Advanced directive placed in chart this admission 01/20/2004    ASNHL (asymmetrical sensorineural hearing loss)     Diverticulosis of large intestine without perforation or abscess without bleeding     Glaucoma     Glaucoma 06/05/2019    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 07/09/2004    Greater trochanteric bursitis of left hip 08/22/2019    History of complete eye exam 09/30/2024    DIABETIC EYE EXAM WITHOUT RETINOPATHY- [facility]    History of pyelonephritis 02/22/2001    Hyperlipidemia     Hypertension     Impaired fasting glucose 06/25/2025    Internal hemorrhoids     Macular degeneration (senile) of retina     Menopause     Osteopenia     Other hyperlipidemia     Personal history of malignant neoplasm of skin 04/21/2012    Personal history of urinary tract infection 01/05/2001    Postmenopausal hormone replacement therapy     Presence of external hearing aid     Type 2 diabetes mellitus without complication (H)           Past Surgical History:   Procedure Laterality Date    BIOPSY  1/00    CATARACT EXTRACTION Left 04/13/2015    [place] [facility] [place] [facility] [place]  11/14    COLONOSCOPY N/A 11/14/2014    Procedure: COLONOSCOPY;  Surgeon: Shady Hay MD;  Location: [facility]  OR    COLONOSCOPY N/A 2019    Procedure: COLONOSCOPY;  Surgeon: Abilio Muller MD;  Location: [facility] OR    COLONOSCOPY - HIM SCAN  1999    Colonoscopy     COLONOSCOPY - HIM SCAN  2009    Colonoscopy     [place]    [facility] [place]      [facility] [place]    [facility] [place]  2012    DEXA - HIM SCAN  10/28/2010    DEXA - HIM SCAN  2003    EYE SURGERY  2004    EH RECORD- NARROW ANGLE GLAUCOMA TREATED WITH LASER IRIDECTOMIES AND INIDONMLES    GENITOURINARY SURGERY      PHACOEMULSIFICATION CLEAR CORNEA WITH TORIC INTRAOCULAR LENS IMPLANT Left 2015    Procedure: PHACOEMULSIFICATION CLEAR CORNEA WITH TORIC INTRAOCULAR LENS IMPLANT;  Surgeon: Horacio Rodrigeuz MD;  Location: [facility] OR    PHACOEMULSIFICATION WITH STANDARD INTRAOCULAR LENS IMPLANT Right 2015    Procedure: PHACOEMULSIFICATION WITH STANDARD INTRAOCULAR LENS IMPLANT;  Surgeon: Horacio Rodriguez MD;  Location: [facility] OR          Family History   Problem Relation Age of Onset    Ovarian Cancer Mother     Heart Disease Father     Myocardial Infarction Father     No Known Problems Maternal Grandmother     Stomach Cancer Maternal Grandfather     Diabetes Type 2  Paternal Grandmother     No Known Problems Paternal Grandfather           Family Status   Relation Name Status    Mo      Fa      MGMo      MGFa      PGMo      PGFa     No partnership data on file          Social History     Socioeconomic History    Marital status: Single     Spouse name: Not on file    Number of children: 0    Years of education: Not on file    Highest education level: Not on file   Occupational History    Not on file   Tobacco Use    Smoking status: Former     Current packs/day: 0.00     Average packs/day: 1 pack/day for 10.0 years (10.0 ttl pk-yrs)     Types: Cigarettes     Start date: 1961     Quit date: 1971     Years since quitting:  "54.5    Smokeless tobacco: Not on file   Substance and Sexual Activity    Alcohol use: No    Drug use: No    Sexual activity: Never   Other Topics Concern    Not on file   Social History Narrative    Not on file     Social Drivers of Health     Financial Resource Strain: Not on file   Food Insecurity: Not on file   Transportation Needs: Not on file   Physical Activity: Not on file   Stress: Not on file   Social Connections: Not on file   Interpersonal Safety: Low Risk  (7/14/2025)    Interpersonal Safety     Do you feel physically and emotionally safe where you currently live?: Yes     Within the past 12 months, have you been hit, slapped, kicked or otherwise physically hurt by someone?: No     Within the past 12 months, have you been humiliated or emotionally abused in other ways by your partner or ex-partner?: No   Housing Stability: Not on file            Objective    /70 (BP Location: Left arm, Patient Position: Sitting, Cuff Size: Adult Regular)   Pulse 72   Temp 99.5  F (37.5  C) (Tympanic)   Ht 1.575 m (5' 2\")   Wt 70.9 kg (156 lb 4.8 oz)   SpO2 98%   BMI 28.59 kg/m    Body mass index is 28.59 kg/m .  Physical Exam   General: This is a well-developed, well-nourished, adequately hydrated, pleasant 79-year-old female who appears in no acute distress.  She is oriented x 3.  Lungs: Are clear bilaterally. Respirations regular and unlabored.  Cardiovascular: Apical pulse regular. S1 and S2 heard without splitting. No murmur. No audible S3 or S4. No edema.  Pedal pulses are palpable and equal bilaterally.  Psych: Alert and oriented in all spheres. Short and long-term memory intact. Mood and affect are appropriate. Speech content is appropriate.      Results for orders placed or performed in visit on 07/14/25   Lipid Profile     Status: None   Result Value Ref Range    Cholesterol 120 <200 mg/dL    Triglycerides 103 <150 mg/dL    Direct Measure HDL 54 >=50 mg/dL    LDL Cholesterol Calculated 45 <100 mg/dL "    Non HDL Cholesterol 66 <130 mg/dL    Patient Fasting > 8hrs? Yes     Narrative    Cholesterol  Desirable: < 200 mg/dL  Borderline High: 200 - 239 mg/dL  High: >= 240 mg/dL    Triglycerides  Normal: < 150 mg/dL  Borderline High: 150 - 199 mg/dL  High: 200-499 mg/dL  Very High: >= 500 mg/dL    Direct Measure HDL  Female: >= 50 mg/dL   Male: >= 40 mg/dL    LDL Cholesterol  Desirable: < 100 mg/dL  Above Desirable: 100 - 129 mg/dL   Borderline High: 130 - 159 mg/dL   High:  160 - 189 mg/dL   Very High: >= 190 mg/dL    Non HDL Cholesterol  Desirable: < 130 mg/dL  Above Desirable: 130 - 159 mg/dL  Borderline High: 160 - 189 mg/dL  High: 190 - 219 mg/dL  Very High: >= 220 mg/dL   Basic metabolic panel     Status: Abnormal   Result Value Ref Range    Sodium 137 135 - 145 mmol/L    Potassium 4.0 3.4 - 5.3 mmol/L    Chloride 100 98 - 107 mmol/L    Carbon Dioxide (CO2) 26 22 - 29 mmol/L    Anion Gap 11 7 - 15 mmol/L    Urea Nitrogen 20.6 8.0 - 23.0 mg/dL    Creatinine 0.94 0.51 - 0.95 mg/dL    GFR Estimate 61 >60 mL/min/1.73m2    Calcium 9.1 8.8 - 10.4 mg/dL    Glucose 128 (H) 70 - 99 mg/dL    Patient Fasting > 8hrs? Yes    Hemoglobin A1c     Status: Abnormal   Result Value Ref Range    Estimated Average Glucose 146 (H) <117 mg/dL    Hemoglobin A1C 6.7 (H) <5.7 %         Signed Electronically by: JAUN Sutherland CNP

## 2025-07-16 RX ORDER — BLOOD-GLUCOSE METER
EACH MISCELLANEOUS DAILY
COMMUNITY
Start: 2024-09-23

## (undated) DEVICE — IRRIGATION-H2O 1000ML

## (undated) DEVICE — CANISTER-SUCTION 2000CC

## (undated) DEVICE — TUBING-SUCTION 20FT

## (undated) DEVICE — CONNECTOR-ERBEFLO 2 PORT

## (undated) DEVICE — LUBRICANT JELLY 2OZ. TUBE

## (undated) RX ORDER — PROPOFOL 10 MG/ML
INJECTION, EMULSION INTRAVENOUS
Status: DISPENSED
Start: 2019-11-22